# Patient Record
Sex: MALE | Race: OTHER | URBAN - METROPOLITAN AREA
[De-identification: names, ages, dates, MRNs, and addresses within clinical notes are randomized per-mention and may not be internally consistent; named-entity substitution may affect disease eponyms.]

---

## 2024-09-30 ENCOUNTER — ANCILLARY PROCEDURE (OUTPATIENT)
Dept: URGENT CARE | Age: 64
End: 2024-09-30
Payer: COMMERCIAL

## 2024-09-30 ENCOUNTER — OFFICE VISIT (OUTPATIENT)
Dept: URGENT CARE | Age: 64
End: 2024-09-30
Payer: COMMERCIAL

## 2024-09-30 VITALS
RESPIRATION RATE: 16 BRPM | SYSTOLIC BLOOD PRESSURE: 164 MMHG | TEMPERATURE: 98.1 F | OXYGEN SATURATION: 98 % | HEART RATE: 72 BPM | HEIGHT: 72 IN | DIASTOLIC BLOOD PRESSURE: 105 MMHG | WEIGHT: 216 LBS | BODY MASS INDEX: 29.26 KG/M2

## 2024-09-30 DIAGNOSIS — S99.911A INJURY OF RIGHT ANKLE, INITIAL ENCOUNTER: Primary | ICD-10-CM

## 2024-09-30 DIAGNOSIS — S99.911A INJURY OF RIGHT ANKLE, INITIAL ENCOUNTER: ICD-10-CM

## 2024-09-30 PROCEDURE — 99203 OFFICE O/P NEW LOW 30 MIN: CPT

## 2024-09-30 PROCEDURE — 73610 X-RAY EXAM OF ANKLE: CPT | Mod: RIGHT SIDE

## 2024-09-30 PROCEDURE — 1036F TOBACCO NON-USER: CPT

## 2024-09-30 PROCEDURE — 3008F BODY MASS INDEX DOCD: CPT

## 2024-09-30 ASSESSMENT — PAIN SCALES - GENERAL: PAINLEVEL: 4

## 2024-09-30 ASSESSMENT — ENCOUNTER SYMPTOMS
COLOR CHANGE: 1
WOUND: 0
ARTHRALGIAS: 1
JOINT SWELLING: 1
NUMBNESS: 0

## 2024-09-30 NOTE — PROGRESS NOTES
"Subjective   Patient ID: Edward Escalante is a 64 y.o. male. They present today with a chief complaint of Leg Pain.    HISTORY OF PRESENT ILLNESS:    Patient presents to the clinic for possible right Achilles injury that occurred on Saturday morning (2 days ago). He reports slipping on wet ground and twisting the right ankle/foot. Reports hearing a \"pop\" in distal right calf/right Achilles region at that time. Pt has had pain and swelling about the right ankle since then, as well as slight right calcaneal bruising. Reports similar type of injury in the same ankle 3 months ago but never had this medically evaluated. Pt states he had some minor right ankle swelling from that injury since then but it acutely worsened with re-injury 2 days ago. He is able to walk but pain worsens with plantar flexion of right foot and with walking up/down steps.    Past Medical History  Allergies as of 09/30/2024    (No Known Allergies)       No current outpatient medications      No past medical history on file.    No past surgical history on file.     reports that he has never smoked. He has never used smokeless tobacco. He reports current alcohol use.    Review of Systems    Review of Systems   Musculoskeletal:  Positive for arthralgias and joint swelling.        Right ankle   Skin:  Positive for color change (bruising; right calcaneus). Negative for wound.   Neurological:  Negative for numbness.                                 Objective    Vitals:    09/30/24 1548   BP: (!) 164/105   Pulse: 72   Resp: 16   Temp: 36.7 °C (98.1 °F)   TempSrc: Oral   SpO2: 98%   Weight: 98 kg (216 lb)   Height: 1.829 m (6')     No LMP for male patient.  PHYSICAL EXAMINATION:    Physical Exam  Vitals and nursing note reviewed.   Constitutional:       General: He is not in acute distress.     Appearance: Normal appearance. He is not ill-appearing.   HENT:      Head: Normocephalic and atraumatic.      Nose: Nose normal.   Eyes:      General:         Right " eye: No discharge.         Left eye: No discharge.      Extraocular Movements: Extraocular movements intact.      Conjunctiva/sclera: Conjunctivae normal.   Cardiovascular:      Rate and Rhythm: Normal rate.      Comments: (+) hypertensive  Pulmonary:      Effort: Pulmonary effort is normal. No respiratory distress.   Musculoskeletal:      Cervical back: Normal range of motion and neck supple.      Right ankle: Swelling and ecchymosis present. No deformity. Tenderness present. Decreased range of motion.      Right Achilles Tendon: Tenderness present. No defects. Osorio's test negative.      Comments: Right ankle: + diffuse soft tissue edema and tenderness, most pronounced over lateral aspect of ankle  Right Achilles: no palpable or visible defect; + tenderness over proximal aspect of tendon  Right calcaneus: + slight bruising over medial aspect of the calcaneus    Limited active plantar flexion of right ankle secondary to pain.   Right foot neurovascularly intact.  Ambulates with a steady gait.   Skin:     General: Skin is warm and dry.   Neurological:      General: No focal deficit present.      Mental Status: He is alert and oriented to person, place, and time.   Psychiatric:         Mood and Affect: Mood normal.         Behavior: Behavior normal.          Procedures    No results found for this visit on 09/30/24.    === 09/30/24 ===    XR ANKLE 3+ VIEWS RIGHT    - Impression -  Soft tissue swelling; otherwise normal ankle radiographs.      Signed by: Paresh Burns 9/30/2024 5:00 PM  Dictation workstation:   [id number]      Diagnostic study results (if any) were reviewed by Lexy Shen PA-C.    Assessment/Plan   Allergies, medications, history, and pertinent labs/EKGs/Imaging reviewed by Lexy Shen PA-C.     Orders and Diagnoses  Diagnoses and all orders for this visit:  Injury of right ankle, initial encounter  -     Referral to Orthopaedic Surgery; Future  -     XR ankle right 3+ views; Future  -      Walking boot, pneumatic      Medical Admin Record    Given overall well appearance, vital signs, history, and exam as above, there is no indication for further emergent testing/intervention at this time.      I discussed with the patient my clinical thoughts at this time given the above and we had a shared decision-making conversation in a patient-centered decision-making model on how to proceed forward. Pt was instructed on the importance of close follow-up. They were told that an urgent care diagnosis is often a preliminary impression and that definitive care is often not able to be given in the urgent care setting. Pt was educated that close follow-up is essential for good health and good outcomes. Patient was provided with the following instructions:            Naproxen or ibuprofen for inflammation/pain as needed.     RICE. Recommend wearing walking boot until follow up with orthopedics.     Plenty of fluids and rest.      Limit repetitive weight bearing.     Follow up with orthopedics in the next 5-7 days (as soon as able).         Clinical impression as well as limitations of available testing/examination, all discussed with patient. Pt is well at this time in the urgent care. They are comfortable with the present assessment and plan to be discharged home. Discussed with them close outpatient follow up, reassessment, and possible further testing/treatment via their PCP/specialist; they agree, understand, and are comfortable with this plan. Pt given the opportunity to ask questions prior to discharge and all questions were answered at this time. Via our discussion, the patient was advised of warning signs and instructions were reviewed. Strict ED precautions were given, acknowledged, and understood. Discussed with the patient/family that it is okay to return to the urgent care at any time for anything. Advised to present to the ED if present condition changes/worsens or if they develop new symptoms at any  time after discharge. Also, advised to go to the ED if they cannot establish outpatient follow-up in time frame specified above. Pt verbalized understanding and agreement with plan and instructions. Discussed the need for close follow up with their primary care provider and/or specialist for further testing/treatment/care/consultation in the short time frame as noted above - they understand these instructions and agree to close follow up for these reasons. Patient discharged home in stable condition.      Follow Up Instructions  No follow-ups on file.    Patient disposition: Home    Electronically signed by Lexy Shen PA-C  6:11 PM

## 2024-10-16 ENCOUNTER — APPOINTMENT (OUTPATIENT)
Dept: ORTHOPEDIC SURGERY | Facility: CLINIC | Age: 64
End: 2024-10-16
Payer: COMMERCIAL

## 2024-10-16 VITALS — BODY MASS INDEX: 29.26 KG/M2 | HEIGHT: 72 IN | WEIGHT: 216 LBS

## 2024-10-16 DIAGNOSIS — M25.571 ACUTE RIGHT ANKLE PAIN: ICD-10-CM

## 2024-10-16 DIAGNOSIS — S99.911A INJURY OF RIGHT ANKLE, INITIAL ENCOUNTER: ICD-10-CM

## 2024-10-16 DIAGNOSIS — S86.001A ACHILLES TENDON INJURY, RIGHT, INITIAL ENCOUNTER: Primary | ICD-10-CM

## 2024-10-16 PROCEDURE — 3008F BODY MASS INDEX DOCD: CPT | Performed by: ORTHOPAEDIC SURGERY

## 2024-10-16 PROCEDURE — 99203 OFFICE O/P NEW LOW 30 MIN: CPT | Performed by: ORTHOPAEDIC SURGERY

## 2024-10-16 PROCEDURE — 1036F TOBACCO NON-USER: CPT | Performed by: ORTHOPAEDIC SURGERY

## 2024-10-16 NOTE — PROGRESS NOTES
Subjective    Patient ID: Edward Escalante is a 64 y.o. male.    Chief Complaint: Injury of the Right Ankle (Pt tripped over a root in the ground 6/2024/Slipped on a wet bridge 2 1/2 weeks ago/+pain and swelling)       This is a 64-year-old male who initially scheduled for evaluation of recurrent right ankle injuries but on presentation today he actually states he has more discomfort into the right calf as well as into the Achilles tendon region.  He had an initial injury in June of this year when he tripped over a root of a tree as he was taken a picture and then subsequent developed ankle swelling.  This continue to bother him through the summer but then he reinjured it again just 2-1/2 weeks ago when he tripped walking on a bridge at the same time taken another photograph.  With his most recent injury is noted more swelling that is causing him to walk with a limp.  The swelling has extended from his right calf to include his shin and ankle.  He has occasionally had other episodes when he was mis-stepping with his left leg and then developed significant right leg pain when pushing off.    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  The right ankle appears to be well reduced and is stable to ligamentous examination without significant tenderness.  There is though significant swelling from his right calf distally to include extending down to the ankle.  There is more swelling posteriorly.  There is moderate tenderness over the medial gastroc muscle but also more significant tenderness at the muscle tendinous junction of the Achilles tendon.  The Achilles tendon is palpable at there is significant swelling in this area making it difficult to assess the continuity of the Achilles tendon.  There is marked increasing pain with attempts of plantarflexion.  Ambulates with a limp    Assessment: Right lower extremity recurrent Achilles tendon injury raising concern for partial tearing at the muscle tendinous junction    Plan: We have suggested crutches and placed him in a fracture boot.  We have suggested an MRI of the right lower extremity to evaluate the gastroc muscle incontinuity into the tendinous region.  Follow-up after completion of the MRI scan.    Right Ankle         No current outpatient medications on file.

## 2024-10-28 ENCOUNTER — APPOINTMENT (OUTPATIENT)
Dept: RADIOLOGY | Facility: CLINIC | Age: 64
End: 2024-10-28
Payer: COMMERCIAL

## 2024-11-04 ENCOUNTER — OFFICE VISIT (OUTPATIENT)
Dept: ORTHOPEDIC SURGERY | Facility: CLINIC | Age: 64
End: 2024-11-04
Payer: COMMERCIAL

## 2024-11-04 VITALS — HEIGHT: 72 IN | WEIGHT: 216 LBS | BODY MASS INDEX: 29.26 KG/M2

## 2024-11-04 DIAGNOSIS — S86.001A ACHILLES TENDON INJURY, RIGHT, INITIAL ENCOUNTER: Primary | ICD-10-CM

## 2024-11-04 PROCEDURE — 3008F BODY MASS INDEX DOCD: CPT | Performed by: ORTHOPAEDIC SURGERY

## 2024-11-04 PROCEDURE — 99214 OFFICE O/P EST MOD 30 MIN: CPT | Performed by: ORTHOPAEDIC SURGERY

## 2024-11-04 PROCEDURE — 1036F TOBACCO NON-USER: CPT | Performed by: ORTHOPAEDIC SURGERY

## 2024-11-04 NOTE — PROGRESS NOTES
Subjective    Patient ID: Edward Escalante is a 64 y.o. male.    Chief Complaint: Follow-up of the Right Ankle       This is a 64-year-old male returns today in follow-up with regard to his right ankle/calf injury.  He has recently completed an MRI scan.  This MRI scan is consistent with a tearing of the Achilles at the muscle tendinous junction.  Patient has been wearing the boot over the last 2 weeks and has noted dramatic improvement with regard to his symptoms.  We tried to truly define the initial onset of the injury.  He did describe an ankle injury back in July of this year but actually feels the calf injury occurred on September 28 but he did not seek medical attention with regard to this.  He then reinjured it when he stubbed his toe and felt more discomfort and swelling into his right calf.    Patient recently is completed an MRI scan.  We reviewed the results with the patient today.  There appears to be a tear at the muscle tendinous junction with edema extending into the Solus muscle.  The Achilles insertion is intact.    This patient's past medical, social, and family history were reviewed as well as a review of systems including updates on the patient's information encounter sheet    Physical Examination  Constitutional: Patient's height and weight reviewed, appears well kempt  Psychiatric: Alert and oriented ×3, appropriate mood and behavior  Pulmonary: Breathing appears nonlabored, no apparent distress  Lymphatic: No appreciable lymphadenopathy to both the upper and lower extremities  Skin: No open lesions, rashes, ulcerations  Neurologic: Gross motor and sensory exam appear intact (except for abnormalities noted in the below muscle skeletal exam)    Musculoskeletal: Once again there is satisfactory range of motion of the right knee without pain elicited.  The right ankle appears to be well reduced and is stable to ligamentous examination.  The Achilles tendon distally is palpable and intact without  a defect palpated.  There is moderate tenderness over the medial gastroc as well as tenderness at the muscle tendinous junction.  With calf squeezing there is some plantarflexion of the foot identified but not to the degree that is noted in the opposite extremity with calf squeezing.    Assessment: Achilles injury at the muscle tendinous junction now over 5 weeks out    Plan: A long discussion ensued with the patient regarding treatment option.  This included both surgical and nonsurgical intervention.  With this being localized at the muscle tenderness junction and being 5 weeks out I do not feel surgical intervention would be of significant benefit.  We have decided on continued conservative treatment options.  A heel lift was placed in his boot of 8 degrees.  He will wear this for the next 2 weeks and then return to the office for reevaluation.  Avoid at risk activities.  All questions were answered.    Right Ankle         No current outpatient medications on file.

## 2024-11-05 DIAGNOSIS — S86.001A ACHILLES TENDON INJURY, RIGHT, INITIAL ENCOUNTER: ICD-10-CM

## 2024-11-18 ENCOUNTER — OFFICE VISIT (OUTPATIENT)
Dept: ORTHOPEDIC SURGERY | Facility: CLINIC | Age: 64
End: 2024-11-18
Payer: COMMERCIAL

## 2024-11-18 VITALS — WEIGHT: 216 LBS | BODY MASS INDEX: 29.26 KG/M2 | HEIGHT: 72 IN

## 2024-11-18 DIAGNOSIS — S86.001A ACHILLES TENDON INJURY, RIGHT, INITIAL ENCOUNTER: Primary | ICD-10-CM

## 2024-11-18 PROCEDURE — 99213 OFFICE O/P EST LOW 20 MIN: CPT | Performed by: ORTHOPAEDIC SURGERY

## 2024-11-18 PROCEDURE — 3008F BODY MASS INDEX DOCD: CPT | Performed by: ORTHOPAEDIC SURGERY

## 2024-11-18 PROCEDURE — 1036F TOBACCO NON-USER: CPT | Performed by: ORTHOPAEDIC SURGERY

## 2024-11-18 NOTE — PROGRESS NOTES
Subjective    Patient ID: Edward Escalante is a 64 y.o. male.    Chief Complaint: Follow-up (F/U RT ACHILLES TENDON INJURY/)       64-year-old male seen in follow-up with regard to his right Achilles tendon injury.  This injury occurred at the muscle tendinous junction based on the previous MRI scan.  Patient has been wearing the fracture boot with an 8 degree left and feels he is improving.  He is ambulatory weightbearing as tolerated.  Does note some mild persistent swelling    This patient's past medical, social, and family history were reviewed as well as a review of systems including updates on the patient's information encounter sheet    Alert and oriented male seated on the exam table  No apparent distress. Normal affect and decision making. Skin intact without redness, warmth or rash. Regular respiration rate which is not labored.  The right calf reveals mild to moderate swelling  Minimal tenderness at the muscle tendinous junction  No tenderness at the Achilles tendon distally  No palpable defect noted  Plantarflex against resistance with satisfactory strength    Assessment: Improving status post conservative management right Achilles tendon tear    Plan: Continue with the boot but decrease the lift to 4 degrees.  Return the office in 2 weeks for reevaluation.  We will then consider outpatient physical therapy program and potentially stopping the boot as well as educating with regard to stretching exercises.      No current outpatient medications on file.

## 2024-12-02 ENCOUNTER — OFFICE VISIT (OUTPATIENT)
Dept: ORTHOPEDIC SURGERY | Facility: CLINIC | Age: 64
End: 2024-12-02
Payer: COMMERCIAL

## 2024-12-02 DIAGNOSIS — S86.001A ACHILLES TENDON INJURY, RIGHT, INITIAL ENCOUNTER: Primary | ICD-10-CM

## 2024-12-02 PROCEDURE — 99213 OFFICE O/P EST LOW 20 MIN: CPT | Performed by: ORTHOPAEDIC SURGERY

## 2024-12-02 PROCEDURE — 1036F TOBACCO NON-USER: CPT | Performed by: ORTHOPAEDIC SURGERY

## 2024-12-02 NOTE — PROGRESS NOTES
Subjective    Patient ID: Edward Escalante is a 64 y.o. male.    Chief Complaint: Follow-up of the Right Ankle       This is a 64-year-old male seen in follow-up for his right Achilles tendon injury.  He sustained a tearing of the Achilles tendon at the muscle tendinous junction.  This occurred over 2 months ago.  Patient has been wearing a boot with a insert heel left.  He feels he is dramatically improved with regard to pain and function.  In fact at times he has been ambulating about the house without the use of an assistive device or the boot and doing well    This patient's past medical, social, and family history were reviewed as well as a review of systems including updates on the patient's information encounter sheet    Alert and oriented male seated on the exam table  No apparent distress. Normal affect and decision making. Skin intact without redness, warmth or rash. Regular respiration rate which is not labored.  The Achilles tendon is palpable and intact without a defect palpable  Minimal tenderness identified over the Achilles muscle tenderness junction  Patient has full strength in plantarflexion of the foot against resistance today  Ambulates to the office today weightbearing as tolerated without a limp without the use of an assistive device    Assessment: Healing right Achilles tendon tear at the muscle tendinous junction    Plan: The patient now will go to a well supported shoe with a heel lift.  He was educated with regard to stretching exercises as he does not wish to go through a physical therapy program.  He now will be allowed to bike and in the next few weeks began to walk on a treadmill.  He is to avoid any hiking or activities that would put him at risk for at least the next 3 months.  Follow-up as needed.    Right Ankle         No current outpatient medications on file.